# Patient Record
(demographics unavailable — no encounter records)

---

## 2024-12-05 NOTE — DISCUSSION/SUMMARY
[de-identified] : Chief complaint: Left knee pain  HPI: Patient is an 85-year-old male who presents the office today for follow-up on left knee pain.  He sustained a fall approximately 3 months ago on September 2024.  He fell onto the bilateral knees.  Since that time he reports that he performed physical therapy for approximately 2 months.  He was taking Tylenol without any significant relief of his discomfort.  He has been elevating and icing the left knee.  Patient reports no significant pain to the right knee.  He does report that he has had ongoing pain to the medial aspect of the left knee.  Denies any new fall, injury, or trauma.  No reported previous surgical intervention to the left knee.  Unable to take NSAIDs secondary to being on a blood thinner.  ROS: Positive for left knee pain  Physical examination of the left knee shows: No erythema  No ecchymosis  There is tenderness to palpation over the medial joint line Minimal effusion Able to perform active straight leg raise Knee flexion from 0 to 110 degrees Light touch is intact throughout Nonantalgic gait Negative Shelly's Negative anterior Drawer No appreciable instability with varus / valgus stress testing Calves are soft and nontender  Review of previous x-rays of the bilateral knees performed on September 15, 2024 shows bilateral knee arthritis, left greater than right, left knee arthritis is most significant in the medial joint compartment  Assessment/plan: Arthritis of the left knee, discussed with the patient that it is my clinical suspicion that his fall exacerbated existing left knee arthritis, discussed treatment options, patient is unable to take NSAIDs secondary to being on a blood thinner, patient can continue with over-the-counter Tylenol on an as-needed basis for pain, he can continue to elevate the left lower extremity above pelvis when resting to help with any edema, he can apply ice to the left knee on an as-needed basis with sensory precautions  I do recommend that the patient restart physical therapy, prescription was provided for this, patient is amenable to ongoing therapy  Patient is a well-controlled diabetic, I did discuss the option of an intra-articular corticosteroid injection to the left knee today however the patient kindly deferred  At this time the patient prefers to continue with physical therapy, he will be provided with a 2-month follow-up with me for repeat evaluation at which time he will consider corticosteroid injection if there is no improvement in his pain  Patient verbalized understanding of all findings in the office today, agrees to follow-up as directed

## 2025-02-06 NOTE — PROCEDURE
[Large Joint Injection] : Large joint injection [Left] : of the left [Knee] : knee [Pain] : pain [X-ray evidence of Osteoarthritis on this or prior visit] : x-ray evidence of Osteoarthritis on this or prior visit [Alcohol] : alcohol [Ethyl Chloride sprayed topically] : ethyl chloride sprayed topically [Sterile technique used] : sterile technique used [____] : [unfilled] [] : Patient tolerated procedure well [Call if redness, pain or fever occur] : call if redness, pain or fever occur [Apply ice for 15min out of every hour for the next 12-24 hours as tolerated] : apply ice for 15 minutes out of every hour for the next 12-24 hours as tolerated [Patient was advised to rest the joint(s) for ____ days] : patient was advised to rest the joint(s) for [unfilled] days [Risks, benefits, alternatives discussed / Verbal consent obtained] : the risks benefits, and alternatives have been discussed, and verbal consent was obtained

## 2025-02-06 NOTE — DISCUSSION/SUMMARY
[de-identified] : Chief complaint: Left knee pain  HPI: Patient is an 85-year-old male who presents to the office today for repeat evaluation of left knee pain.  Patient reports that he has constant pain which she localizes to the medial aspect of the knee.  Pain is worse with range of motion and with weightbearing/ambulation.  Denies any new fall, injury, or trauma.  Has been ambulating with a single-point cane as a result of his pain.  Has been taking Tylenol with questionable relief of his discomfort.  Patient was prescribed physical therapy at his last visit in December 2024.  He reports that he did not start physical therapy because his wife throughout the referral.  He does report occasionally feeling as though the left knee is unstable.  Denies any actual buckling or locking of the left knee.  ROS: Positive for left knee pain  Physical examination of the LEFT knee shows: No erythema  No ecchymosis  Tenderness to palpation over medial joint line, mildly over the lateral joint line Minimal effusion Able to perform active straight leg raise Knee flexion from 0 to approximately 100 degrees Equivocal Shelly's medially No appreciable tibial translation with Lachman No appreciable instability with varus / valgus stress testing Calves are soft and nontender  Assessment/plan: Arthritis of the left knee, discussed natural course and history of arthritis with the patient, discussed treatment options  1.  Discussed the possibility of an intra-articular corticosteroid injection to the left knee today, discussed all associated risks and benefits including but not limited to no improvement with injection, increased pain with injection, sepsis, infection to the joint, hypo-/hyperpigmentation of the skin, elevated blood glucose in diabetics, patient is a well-controlled diabetic, he takes his blood glucose regularly, I did discuss with the patient in detail at length the risk that his blood glucose would be elevated secondary to the corticosteroid injection, discussed with the patient the importance of ongoing blood glucose maintenance, expressed to the patient the importance of taking his blood glucose every day over the next 1-2 weeks, patient understands that if his blood glucose is elevated that he needs to contact his primary care doctor versus present to the emergency department immediately, patient verbalized understanding of all associated risks and benefits and decided to proceed with the procedure, verbal consent obtained, procedure note attached, patient tolerated the procedure well, post care instructions discussed in detail with the patient 2.  Patient is not able to take NSAIDs secondary to being on a blood thinner, he can continue with Tylenol on an as-needed basis for pain 3.  Patient was provided with a prescription for formal physical therapy is that he can get started on that, he is amenable to therapy 4.  Patient can elevate the left lower extremity above pelvis when resting to help with edema management, ice or heat can be applied to the left knee on an as-needed basis with diabetic sensory precautions 5  Discussed activity modifications with the patient, also discussed potential benefits of weight loss and healthy weight management with the patient 6.  Discussed the possibility of submitting for authorization for viscosupplementation, patient would like to see how he does with the corticosteroid injection and with physical therapy before considering viscosupplementation  Patient will be provided with an 8-week follow-up with me for repeat evaluation, he verbalized understanding of all findings in the office today, agrees to follow-up as directed

## 2025-02-18 NOTE — ASSESSMENT
[FreeTextEntry1] : Assessment and Plan: - Asbestosis Diagnosis : Based on the medical history and symptoms of consistent breathing problems, the patient's diagnosis of asbestosis remains valid.  - Diagnostic Tests:  There was nom,CAT scan chest results taken by [facility].  - Referrals: Recommended a possible cardiology visit to address the heart rate and blood pressure irregularity.  - Follow-Up: Follow-up appointment to be scheduled when Mr. Mathur meets with his vascular/cardiologist. Based on the results from that consultation, further management plan will be determined.  He is going to need to follow-up with his primary care doctor regarding the lucencies within the cervical spine.  This may be related to a condition such as multiple myeloma  - COPD Diagnosis : The patient's diagnosis of COPD seems consistent with his complaints of chronic breathing problems. Need to continue monitoring the patient. - Therapeutic Interventions: Continue the management of COPD. Adjust the treatment plan as per requirement. Patient may benefit from a CAT scan of the chest to further evaluate issues within his thoracic chest - Possible Cancer Diagnosis : Mr. Mathur was informed about potential cancerous spots found in one of his previous tests, but he chose to ignore them.    - Patient Education: Mr. Mathur needs to be made aware of the importance of early cancer detection and treatment.

## 2025-02-18 NOTE — HISTORY OF PRESENT ILLNESS
[TextBox_4] : - Summary : Landon Mathur is presenting for a review of recent CAT scans conducted at [facility]. Mr. Mathur's previous medical history is significant for conditions such as asbestosis, COPD, and pleural plaquing. He recently underwent CATH and experienced chest pain following the procedure. He has been experiencing lower back pain two months ago but showed no interest in mentioning it.  - History of Present Illness : Mr. Mathur, aged 86, has a complex medical history, including asbestosis, COPD, and pleural plaquing. Notably, he underwent a coronary procedure in the recent past, in which he recalls feeling chest pains and noticing unusual bleeding. Post-procedure, he experienced a substantial drop in blood pressure leading to an episode of passing out at home. In the aftermath of his recent vascular procedures and during subsequent scans, Mr. Mathur was informed about potential cancerous spots, but he chose to ignore them, considering them non-consequential at the time. Recently, he has been feeling pain around the upper section of his back and has taken steps to see a pulmonary specialist on the advice of his vascular doctor.  I took the opportunity to review testing from our [facility].  However there was no CAT scan of the chest just a chest x-ray which failed to identify any abnormalities.  There was a CAT scan of his neck which demonstrated lucencies in the cervical spine - Past Medical History : Mr. Mathur has a significant past medical history. He is an ex-smoker but quit about 35 years back. He has a diagnosis of asbestosis, COPD, pleural plaquing, AND diabetes.

## 2025-03-25 NOTE — END OF VISIT
[] : Resident [Time Spent: ___ minutes] : I have spent [unfilled] minutes of time on the encounter which excludes teaching and separately reported services. [FreeTextEntry3] : Patient seen and examined with resident, chart reviewed.  No LUTS or uremic symptoms noted on encounter today.  No labwork available for review since Sept 2024, will reorder today.  Has h/o CABG and is off diuretics, ACEI and BB therapy currently due to issues with hypotension in the past.  Also has not had cardiac evaluation in over 2 years.  Remains on DOAC.  BP high on encounter today.  Will restart Cozaar and Metoprolol, repeat labs this morning, and send to in-house cardiology for further management of CAD and prior CABG.  Holding po potassium supplementation.  RTC 1 month.

## 2025-03-25 NOTE — PHYSICAL EXAM
[General Appearance - Alert] : alert [General Appearance - In No Acute Distress] : in no acute distress [Auscultation Breath Sounds / Voice Sounds] : lungs were clear to auscultation bilaterally [Heart Rate And Rhythm] : heart rate was normal and rhythm regular [Heart Sounds] : normal S1 and S2 [Heart Sounds Gallop] : no gallops [Murmurs] : no murmurs [Heart Sounds Pericardial Friction Rub] : no pericardial rub [Bowel Sounds] : normal bowel sounds [Abdomen Soft] : soft [Abdomen Tenderness] : non-tender [] : no hepato-splenomegaly [Abdomen Mass (___ Cm)] : no abdominal mass palpated [No CVA Tenderness] : no ~M costovertebral angle tenderness [No Spinal Tenderness] : no spinal tenderness [Oriented To Time, Place, And Person] : oriented to person, place, and time [Impaired Insight] : insight and judgment were intact [Affect] : the affect was normal

## 2025-03-25 NOTE — REVIEW OF SYSTEMS
[Shortness Of Breath] : shortness of breath [SOB on Exertion] : shortness of breath during exertion [Fever] : no fever [Chills] : no chills [Heart Rate Is Slow] : the heart rate was not slow [Heart Rate Is Fast] : the heart rate was not fast [Chest Pain] : no chest pain [Palpitations] : no palpitations [Leg Claudication] : no intermittent leg claudication [Wheezing] : no wheezing [Cough] : no cough [Orthopnea] : no orthopnea [Abdominal Pain] : no abdominal pain [Vomiting] : no vomiting [Constipation] : no constipation [Diarrhea] : no diarrhea [Heartburn] : no heartburn [Joint Swelling] : no joint swelling [Joint Stiffness] : no joint stiffness [Limb Pain] : no limb pain [Limb Swelling] : no limb swelling

## 2025-03-25 NOTE — HISTORY OF PRESENT ILLNESS
[FreeTextEntry1] : 86M ex-smoker w/ PMHx HTN, HLD, DM, HFmEF LVEF 40%, CAD S/P stents x 2, 2018, CABG W/ Dr. Cole 2018, Mitral Clip 11/2020 w/ Dr. Eason/Gali, AFib (on eliquis), asbestosis c/b pleural plaque presents to clinic after hospitalization in September for syncopal episode and found to have BONNIE. Cr. 1.6, K 5.2, eGFR 42. No complaints today. No leg swelling and no issues urinating.

## 2025-03-25 NOTE — ASSESSMENT
[FreeTextEntry1] : 86M ex-smoker w/ PMHx HTN, HLD, DM, HFmEF LVEF 40%, CAD s/p stents x 2, 2018, CABG w/ Dr. Cole 2018, Mitral Clip 11/2020 w/ Dr. Eason/Gali, AFib (on eliquis), asbestosis c/b pleural plaque and BPH presents to clinic after hospitalization in September for syncopal episode and found to have BONNIE. Cr. 1.6, K 5.2, eGFR 42. No complaints today. No leg swelling and no issues urinating.  #BONNIE likely 2/2 dehydration and hypotension #h/o Hypokalemia on Potassium supplements - stop Potassium supplements - will restart Losartan 25mg qD - check BNP, CMP, UA, protein/creatinine ratio, a1c, lipid panel  #HTN - diuretics and antihypertensives stopped by his doctor for hypotension - /79 this visit - start Losartan 25mg qD - stop potassium supplements  #DM #HLD - most recent a1c from PCP as per patient is 6.9% last year - c/w Lipitor 40mg qhs - c/w Trajenta 5mg qD  #CAD s/p PCI s/p CABG #CHF #AFib s/p PPM - recommend cardiology follow up - recommend starting Metoprolol ER 25mg qD - c/w ASA and Eliquis - limit water intake to 1.2L / day  #BPH - c/w Tamsulosin 0.4mg qhs  #HCM - RTC in 1 month with blood work

## 2025-04-03 NOTE — DISCUSSION/SUMMARY
[de-identified] : Chief complaint: Follow-up on left knee pain  HPI: Patient is an 86-year-old male presents the office today for the evaluation/reevaluation of left knee pain.  Has previously diagnosed arthritis of the left knee.  Had a corticosteroid injection at his last visit.  Reports only approximately 1 week of improvement with the corticosteroid injection.  Has been doing physical therapy with improvement in his left knee pain.  Reports less frequent pain.  Also reports improved range of motion with ongoing therapy.  Pleased with his progress in therapy.  ROS: Positive for left knee pain  Physical examination left knee:  No appreciable edema No palpable masses Patient is able to perform active straight leg raise Active range of motion from 0 to approximately 110 degrees There is medial joint line tenderness Stable to valgus and varus stress testing Negative Shelly's Left calf is soft and nontender  Assessment/plan: Arthritis of the left knee, discussed natural course and history of arthritis with the patient  1.  At this point I recommend ongoing physical therapy, prescription was provided for this 2.  Patient is unable to take NSAIDs secondary to being on a blood thinner, he can take over-the-counter Tylenol on an as-needed basis for pain 3.  Patient only saw approximately 1 week of improvement with corticosteroid injection, at this time I believe the patient would benefit from viscosupplementation, we will submit for authorization for Euflexxa 3 injections to the left knee  Patient will be provided with a 6-week follow-up with me for repeat evaluation and for his first Euflexxa 3 injection to the left knee, patient verbalized understanding of all findings in the office today, agrees to follow-up as directed

## 2025-04-17 NOTE — DISCUSSION/SUMMARY
[de-identified] : Chief complaint: Left knee pain  HPI: Patient is an 86-year-old male who reports to office for subsequent reevaluation of left knee pain. Here to receive gel injection.  Denies any new fall, injury, or trauma.  ROS: Positive for left knee pain  Physical examination of the left knee:  No appreciable edema No erythema No ecchymosis Skin is intact Patient is able to perform active straight leg raise Active range of motion from 0 to 110 degrees Tenderness to palpation over medial joint line Negative Shelly's Stable to valgus and varus stress testing Left calf is soft and nontender  Assessment/plan: arthritis of left knee  I again discussed the risk and benefits of a gel injection. The risks included but were not limited to bleeding, infection, injury to nearby nerve/vessel/structures, skin irritation, and no guarantee of pain relief. Patient verbalized understanding and elected to proceed. Verbal consent was obtained.  With the patient's approval, the LEFT knee FIRST Euflexxa injection was performed in office today. See the attached procedure note for further details. Explained to the patient that the full effect of the medication may take up to 6 weeks for it to kick in.  The patient was advised to rest/ice the area and can alternate with warm compresses. Instructed not to do any strenuous activity that would further aggravate their symptoms.  Patient will follow-up in 1 week for SECOND round of injections. All of the patient's questions/concerns were answered in detail.  Procedure Large joint injection was performed of LEFT knee. The site was prepped with alcohol, ethyl chloride sprayed topically and sterile technique used. An injection of 20mg of Euflexxa, series # 1 was used.  Patient tolerated procedure well. Patient was advised to call if redness, pain or fever occur and apply ice for 15 minutes out of every hour for the next 12-24 hours as tolerated.  The risks benefits, and alternatives have been discussed, and verbal consent was obtained.

## 2025-04-24 NOTE — DISCUSSION/SUMMARY
[de-identified] : Chief complaint: Left knee pain  HPI: Patient is an 86-year-old male who reports to office for subsequent reevaluation of left knee pain. Here to receive gel injection. Denies any new fall, injury, or trauma.  Had his first injection last week.  Denies any associated complications.  ROS: Positive for left knee pain  Physical examination of the left knee:  No appreciable edema No appreciable erythema Skin is intact Patient is able to perform active straight leg raise Active range of motion from 0 to 110 degrees Tenderness to palpation over medial joint line Negative Shelly's Stable to valgus and varus stress testing Left calf is soft and nontender  Assessment/plan: arthritis of left knee  I again discussed the risk and benefits of a gel injection. The risks included but were not limited to bleeding, infection, injury to nearby nerve/vessel/structures, skin irritation, and no guarantee of pain relief. Patient verbalized understanding and elected to proceed. Verbal consent was obtained.  With the patient's approval, the LEFT knee SECOND Euflexxa injection was performed in office today. See the attached procedure note for further details. Explained to the patient that the full effect of the medication may take up to 6 weeks for it to kick in.  The patient was advised to rest/ice the area and can alternate with warm compresses. Instructed not to do any strenuous activity that would further aggravate their symptoms.  Patient will follow-up in 1 week for THIRD round of injections. All of the patient's questions/concerns were answered in detail.  Procedure Large joint injection was performed of LEFT knee. The site was prepped with alcohol, ethyl chloride sprayed topically and sterile technique used. An injection of 20mg of Euflexxa, series # 2 was used.  Patient tolerated procedure well. Patient was advised to call if redness, pain or fever occur and apply ice for 15 minutes out of every hour for the next 12-24 hours as tolerated.  The risks benefits, and alternatives have been discussed, and verbal consent was obtained.

## 2025-05-01 NOTE — DISCUSSION/SUMMARY
[de-identified] : Chief complaint: Left knee pain  HPI: Patient is an 86-year-old male who reports to office for subsequent reevaluation of left knee pain. Here to receive gel injection. Denies any new fall, injury, or trauma. Had his second injection last week. Denies any associated complications.  ROS: Positive for left knee pain  Physical examination of the left knee:  No appreciable edema No appreciable erythema Skin is intact Patient is able to perform active straight leg raise Active range of motion from 0 to 110 degrees Tenderness to palpation over medial joint line Negative Shelly's Stable to valgus and varus stress testing Left calf is soft and nontender  Assessment/plan: arthritis of left knee  I again discussed the risk and benefits of a gel injection. The risks included but were not limited to bleeding, infection, injury to nearby nerve/vessel/structures, skin irritation, and no guarantee of pain relief. Patient verbalized understanding and elected to proceed. Verbal consent was obtained.  With the patient's approval, the LEFT knee THIRD Euflexxa injection was performed in office today. See the attached procedure note for further details. Explained to the patient that the full effect of the medication may take up to 6 weeks for it to kick in.  The patient was advised to rest/ice the area and can alternate with warm compresses. Instructed not to do any strenuous activity that would further aggravate their symptoms.  Patient will follow-up in 6 months for repeat evaluation of the left knee. All of the patient's questions/concerns were answered in detail.  Procedure Large joint injection was performed of LEFT knee. The site was prepped with alcohol, ethyl chloride sprayed topically and sterile technique used. An injection of 20mg of Euflexxa, series # 3 was used.  Patient tolerated procedure well. Patient was advised to call if redness, pain or fever occur and apply ice for 15 minutes out of every hour for the next 12-24 hours as tolerated.  The risks benefits, and alternatives have been discussed, and verbal consent was obtained.

## 2025-05-06 NOTE — ASSESSMENT
[FreeTextEntry1] : 86M ex-smoker w/ PMHx HTN, HLD, DM, HFmEF LVEF 40%, CAD s/p stents x 2, 2018, CABG w/ Dr. Cole 2018, Mitral Clip 11/2020 w/ Dr. Eason/Gali, AFib (on eliquis), asbestosis c/b pleural plaque and BPH presented initially to clinic after hospitalization in September for syncopal episode and found to have BONNIE. Cr. 1.6, K 5.2, eGFR 42. No complaints today. No leg swelling and no issues urinating.  #CKD 3a #Hyperkalemia  - Cr 1.5, eGFR 45, Pr/cr 0.3, A/cr 142  - Taking potassium supplements intermittently. Asked patient to stop  - Stopped Losartan due to concerns for low blood pressure  - check BNP, CMP, UA, protein/creatinine ratio, a1c, lipid panel  #HTN - /66 this visit - stable off BP therapy  #DM #HLD - A1c 7.5 - c/w Lipitor 40mg qhs - c/w Trajenta 5mg qD - Advised patient to consider ozempic and/or farxiga. He will discuss with diabetes doctor   #CAD s/p PCI s/p CABG #CHF #AFib s/p PPM - recommend cardiology follow up - recommend starting Metoprolol ER 25mg qD - c/w ASA and Eliquis - limit water intake to 1.2L / day  #BPH - c/w Tamsulosin 0.4mg qhs  #HCM - RTC in 6 months

## 2025-05-06 NOTE — HISTORY OF PRESENT ILLNESS
[FreeTextEntry1] : 86M ex-smoker w/ PMHx HTN, HLD, DM, HFmEF LVEF 40%, CAD S/P stents x 2, 2018, CABG W/ Dr. Cole 2018, Mitral Clip 11/2020 w/ Dr. Eason/Gali, AFib (on eliquis), asbestosis c/b pleural plaque presents to clinic for follow up. No complaints today. No leg swelling and no issues urinating.

## 2025-05-06 NOTE — END OF VISIT
[] : Resident [FreeTextEntry3] :  Patient seen and examined with resident, chart reviewed.  No LUTS or uremic symptoms noted on encounter today.  Renal function stable and proteinuria controlled.  BP well controlled on clinic readings today.  Noted uncontrolled HbA1c, advised patient to consider SGLT2i and GLP1RA therapy, he would like to review with his endocrinologist before initiating.  RTC in 6 months with labwork 2-4 weeks prior. [Time Spent: ___ minutes] : I have spent [unfilled] minutes of time on the encounter which excludes teaching and separately reported services.

## 2025-07-08 NOTE — REVIEW OF SYSTEMS
[SOB on Exertion] : shortness of breath during exertion [Negative] : Heme/Lymph [Shortness Of Breath] : no shortness of breath [Wheezing] : no wheezing [Cough] : no cough [FreeTextEntry9] : R shoulder pain

## 2025-07-08 NOTE — PHYSICAL EXAM
[Restricted in physically strenuous activity but ambulatory and able to carry out work of a light or sedentary nature] : Status 1- Restricted in physically strenuous activity but ambulatory and able to carry out work of a light or sedentary nature, e.g., light house work, office work [Normal] : affect appropriate [Ambulatory and capable of all self care but unable to carry out any work activities] : Status 2- Ambulatory and capable of all self care but unable to carry out any work activities. Up and about more than 50% of waking hours [de-identified] : tenderness on palpation of R shoulder  [de-identified] : PPM noted on L chest wall

## 2025-07-08 NOTE — HISTORY OF PRESENT ILLNESS
[ECOG Performance Status: 1 - Restricted in physically strenuous activity but ambulatory and able to carry out work of a light or sedentary nature] : Performance Status: 1 - Restricted in physically strenuous activity but ambulatory and able to carry out work of a light or sedentary nature, e.g., light house work, office work [de-identified] : 86M w/ hx of asbestosis, COPD, and pleural plaquing, HTN, HLD, DM, HFmEF LVEF 40%, CAD S/P stents x 2, 2018, CABG W/ Dr. Cole 2018, Mitral Clip 11/2020 w/ Dr. Eason/Gali, AFib (on eliquis), PPM (not compatible with MRI), ex-smoker but quit about 35 years back. He presents with concerns about c-spine lesions. He underwent evaluation for his heart at [facility] and was found to have c-spine lesions. Currently, he reports experiencing right shoulder pain for approximately six weeks. The pain is described as pressure-like and is most noticeable when he gets up. He has no neck pain. The patient's primary care physician suggested the possibility of cancer and referred him for further evaluation. The patient also reports a growth on his back that was biopsied 18 to 20 years ago, but the results were inconclusive.   CT C-spine (9/2024)  No acute cervical spine fracture or traumatic subluxation  Multifocal osseous lucencies most apparent at C4, C5 and C6 which could represent metastatic disease  Flowing anterior ossifications from C2 to T3 which can be seen in the setting of diffuse idiopathic skeletal hyperostosis.  Multilevel cervical spondylotic changes as above.    CBC (8/6/2024)  WBC 7.94, Hb 13.1, MCV 91, Platelets 153k   CMP (3/2025)  Cr 1.5, Alk phos 189  PSA (2/2024) - 1.85   Today, pt is feeling well without any complaint other than his R shoulder pain. He denies any recent weight loss, reporting an 8-pound weight gain due to knee issues. The patient also denies other symptoms such as diarrhea, hematochezia, or hemoptysis.  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 Lives with daughter and wife. Exmoker (80 pack-year, quitted 35 years ago). Denies alcohol use. FHx: Denies family hx of cancer [de-identified] : 7/8/25: Landon returns for a follow-up. He underwent a PET/CT scan, which showed no suspicious FDG-avid lesions. He is exhausted from walking from the parking lot to the clinic. Otherwise, he has no new complaint.  [ECOG Performance Status: 2 - Ambulatory and capable of all self care but unable to carry out any work activities] : Performance Status: 2 - Ambulatory and capable of all self care but unable to carry out any work activities. Up and about more than 50% of waking hours

## 2025-07-08 NOTE — CONSULT LETTER
[Dear  ___] : Dear  [unfilled], [Courtesy Letter:] : I had the pleasure of seeing your patient, [unfilled], in my office today. [Please see my note below.] : Please see my note below. [Consult Closing:] : Thank you very much for allowing me to participate in the care of this patient.  If you have any questions, please do not hesitate to contact me. [Sincerely,] : Sincerely, [FreeTextEntry3] : (Josie Faat) Patel Banks DO Medical oncology/hematology at [facility]

## 2025-07-08 NOTE — ASSESSMENT
[Future Reassessment of Pain Scale] : Future reassessment of pain scale    [Palliative Care Plan] : not applicable at this time [FreeTextEntry1] : Mr. Mathur is an ex-smoker, 86M w/ a strong cardiac hx including HFmEF LVEF 40%, CAD S/P stents x 2, 2018, CABG 2018, Mitral Clip 11/2020, AFib (on eliquis) and PPM (not compatible with MRI). He also has pleural plaque secondary to asbestosis exposure, COPD, HTN, HLD and DM. He presents with concern for C-spine lesions.   # Multifocal osseous lucencies most apparent at C4, C5 and C6 noted on CT C-spine  --PET/CT shows no FDG-avid lesions --MM panel and PSA, unremarkable  --elevated Alk phos w/ elevated GGT likely GI origin --After careful discussion, due to his comorbidities, we decided on observation for his C-spine lesions  # CAD  # COPD  # Pleural plaque due to asbestosis exposure --f/u pulm and cardio as scheduled   RTC PRN and follow up with his other specialists as scheduled.

## 2025-07-08 NOTE — HISTORY OF PRESENT ILLNESS
[ECOG Performance Status: 1 - Restricted in physically strenuous activity but ambulatory and able to carry out work of a light or sedentary nature] : Performance Status: 1 - Restricted in physically strenuous activity but ambulatory and able to carry out work of a light or sedentary nature, e.g., light house work, office work [de-identified] : 86M w/ hx of asbestosis, COPD, and pleural plaquing, HTN, HLD, DM, HFmEF LVEF 40%, CAD S/P stents x 2, 2018, CABG W/ Dr. Cole 2018, Mitral Clip 11/2020 w/ Dr. Eason/Gali, AFib (on eliquis), PPM (not compatible with MRI), ex-smoker but quit about 35 years back. He presents with concerns about c-spine lesions. He underwent evaluation for his heart at [facility] and was found to have c-spine lesions. Currently, he reports experiencing right shoulder pain for approximately six weeks. The pain is described as pressure-like and is most noticeable when he gets up. He has no neck pain. The patient's primary care physician suggested the possibility of cancer and referred him for further evaluation. The patient also reports a growth on his back that was biopsied 18 to 20 years ago, but the results were inconclusive.   CT C-spine (9/2024)  No acute cervical spine fracture or traumatic subluxation  Multifocal osseous lucencies most apparent at C4, C5 and C6 which could represent metastatic disease  Flowing anterior ossifications from C2 to T3 which can be seen in the setting of diffuse idiopathic skeletal hyperostosis.  Multilevel cervical spondylotic changes as above.    CBC (8/6/2024)  WBC 7.94, Hb 13.1, MCV 91, Platelets 153k   CMP (3/2025)  Cr 1.5, Alk phos 189  PSA (2/2024) - 1.85   Today, pt is feeling well without any complaint other than his R shoulder pain. He denies any recent weight loss, reporting an 8-pound weight gain due to knee issues. The patient also denies other symptoms such as diarrhea, hematochezia, or hemoptysis.  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 Lives with daughter and wife. Exmoker (80 pack-year, quitted 35 years ago). Denies alcohol use. FHx: Denies family hx of cancer [de-identified] : 7/8/25: Landon returns for a follow-up. He underwent a PET/CT scan, which showed no suspicious FDG-avid lesions. He is exhausted from walking from the parking lot to the clinic. Otherwise, he has no new complaint.  [ECOG Performance Status: 2 - Ambulatory and capable of all self care but unable to carry out any work activities] : Performance Status: 2 - Ambulatory and capable of all self care but unable to carry out any work activities. Up and about more than 50% of waking hours

## 2025-07-08 NOTE — CONSULT LETTER
[Dear  ___] : Dear  [unfilled], [Courtesy Letter:] : I had the pleasure of seeing your patient, [unfilled], in my office today. [Please see my note below.] : Please see my note below. [Consult Closing:] : Thank you very much for allowing me to participate in the care of this patient.  If you have any questions, please do not hesitate to contact me. [Sincerely,] : Sincerely, [FreeTextEntry3] : (Josie Faat) Patel Banks DO Medical oncology/hematology at [facility]

## 2025-07-08 NOTE — PHYSICAL EXAM
[Restricted in physically strenuous activity but ambulatory and able to carry out work of a light or sedentary nature] : Status 1- Restricted in physically strenuous activity but ambulatory and able to carry out work of a light or sedentary nature, e.g., light house work, office work [Normal] : affect appropriate [Ambulatory and capable of all self care but unable to carry out any work activities] : Status 2- Ambulatory and capable of all self care but unable to carry out any work activities. Up and about more than 50% of waking hours [de-identified] : tenderness on palpation of R shoulder  [de-identified] : PPM noted on L chest wall